# Patient Record
Sex: MALE | Race: WHITE | NOT HISPANIC OR LATINO | Employment: UNEMPLOYED | URBAN - METROPOLITAN AREA
[De-identification: names, ages, dates, MRNs, and addresses within clinical notes are randomized per-mention and may not be internally consistent; named-entity substitution may affect disease eponyms.]

---

## 2023-06-15 ENCOUNTER — OFFICE VISIT (OUTPATIENT)
Dept: GASTROENTEROLOGY | Facility: CLINIC | Age: 58
End: 2023-06-15
Payer: COMMERCIAL

## 2023-06-15 VITALS
HEART RATE: 81 BPM | HEIGHT: 69 IN | SYSTOLIC BLOOD PRESSURE: 142 MMHG | BODY MASS INDEX: 44.14 KG/M2 | OXYGEN SATURATION: 94 % | WEIGHT: 298 LBS | DIASTOLIC BLOOD PRESSURE: 90 MMHG | RESPIRATION RATE: 18 BRPM

## 2023-06-15 DIAGNOSIS — K21.9 GASTROESOPHAGEAL REFLUX DISEASE, UNSPECIFIED WHETHER ESOPHAGITIS PRESENT: Primary | ICD-10-CM

## 2023-06-15 DIAGNOSIS — K44.9 HIATAL HERNIA: ICD-10-CM

## 2023-06-15 DIAGNOSIS — Z12.11 SCREENING FOR COLON CANCER: ICD-10-CM

## 2023-06-15 PROCEDURE — 99204 OFFICE O/P NEW MOD 45 MIN: CPT | Performed by: PHYSICIAN ASSISTANT

## 2023-06-15 RX ORDER — ESOMEPRAZOLE MAGNESIUM 40 MG/1
40 CAPSULE, DELAYED RELEASE ORAL
COMMUNITY
Start: 2023-02-27

## 2023-06-15 RX ORDER — FLUTICASONE PROPIONATE 50 MCG
SPRAY, SUSPENSION (ML) NASAL
COMMUNITY
Start: 2023-06-08

## 2023-06-15 RX ORDER — LISINOPRIL 10 MG/1
10 TABLET ORAL DAILY
COMMUNITY

## 2023-06-15 RX ORDER — HYDROCHLOROTHIAZIDE 12.5 MG/1
12.5 TABLET ORAL DAILY
COMMUNITY

## 2023-06-15 RX ORDER — FEXOFENADINE HCL 180 MG/1
180 TABLET ORAL
COMMUNITY
Start: 2023-06-08

## 2023-06-15 RX ORDER — ATORVASTATIN CALCIUM 40 MG/1
40 TABLET, FILM COATED ORAL DAILY
COMMUNITY

## 2023-06-15 NOTE — PATIENT INSTRUCTIONS
Scheduled date of EGD/colonoscopy (as of today): 6/29/23  Physician performing EGD/colonoscopy: Malryn Romero  Location of EGD/colonoscopy: [facility]  Desired bowel prep reviewed with patient: Modified 2 Day Dulc/Luis A  Instructions reviewed with patient by: Pelon BEVERLY  Clearances:

## 2023-06-15 NOTE — PROGRESS NOTES
[facility] - Outpatient Consultation  Curtis FLORES 66 y o  male MRN: [id number]  Encounter: [id number]          ASSESSMENT AND PLAN:      1  Gastroesophageal reflux disease  2  Hiatal hernia  3  Screening for colon cancer    Patient was referred by the [address] [place] [address] for EGD/colonoscopy  He has a long history of GERD since the 90s and a hiatal hernia  He is PPI dependent on Nexium 40mg po daily  He also had a colonoscopy several years ago but the bowel prep was inadequate and needs a repeat exam   No family history of esophageal, stomach, or colon cancer  Will plan for EGD and colonoscopy to investigate  Will use a modified 2 day bowel prep  Continue Nexium  GERD modifications  Recommend fiber supplementation for his BM irregularities  ______________________________________________________________________    HPI:  Patient is a pleasant 66year old male with a PMH of HTN, hyperlipidemia who was referred by the [address] [place] [address] for an EGD and colonoscopy  Patient has a long history of GERD since the 90s  He has a known hiatal hernia  He is PPI dependent on Nexium 40mg po daily (he reports other PPIs were less effective or caused SEs)  He reports he had a colonoscopy several years ago but the bowel prep was inadequate and he needs a repeat exam   No history of polyps  He reports issues with chronic constipation but also diarrhea at times  No family history of esophageal, stomach, or colon cancer  REVIEW OF SYSTEMS:    CONSTITUTIONAL: Denies any fever, chills, rigors, and weight loss  HEENT: No earache or tinnitus  Denies hearing loss or visual disturbances  CARDIOVASCULAR: No chest pain or palpitations  RESPIRATORY: Denies any cough, hemoptysis, shortness of breath or dyspnea on exertion  GASTROINTESTINAL: As noted in the History of Present Illness  GENITOURINARY: No problems with urination  Denies any hematuria or dysuria  NEUROLOGIC: No dizziness or vertigo, denies headaches  "  MUSCULOSKELETAL: Denies any muscle or joint pain  SKIN: Denies skin rashes or itching  ENDOCRINE: Denies excessive thirst  Denies intolerance to heat or cold  PSYCHOSOCIAL: Denies depression or anxiety  Denies any recent memory loss  Historical Information   Past Medical History:   Diagnosis Date   • Hyperlipidemia    • Hypertension      Past Surgical History:   Procedure Laterality Date   • TONSILLECTOMY AND ADENOIDECTOMY       Social History   Social History     Substance and Sexual Activity   Alcohol Use Not on file     Social History     Substance and Sexual Activity   Drug Use Not on file     Social History     Tobacco Use   Smoking Status Not on file   Smokeless Tobacco Not on file     No family history on file  Meds/Allergies       Current Outpatient Medications:   •  atorvastatin (LIPITOR) 40 mg tablet  •  esomeprazole (NexIUM) 40 MG capsule  •  fexofenadine (ALLEGRA) 180 MG tablet  •  fluticasone (FLONASE) 50 mcg/act nasal spray  •  hydrochlorothiazide (HYDRODIURIL) 12 5 mg tablet  •  lisinopril (ZESTRIL) 10 mg tablet    Allergies   Allergen Reactions   • Pollen Extract Other (See Comments)           Objective     Blood pressure 142/90, pulse 81, resp  rate 18, height 5' 9\" (1 753 m), weight 135 kg (298 lb), SpO2 94 %  Body mass index is 44 01 kg/m²  PHYSICAL EXAM:      General Appearance:   Alert, cooperative, no distress   HEENT:   Normocephalic, atraumatic, anicteric     Neck:  Supple, symmetrical, trachea midline   Lungs:   Clear to auscultation bilaterally; no rales, rhonchi or wheezing; respirations unlabored    Heart[de-identified]   Regular rate and rhythm; no murmur, rub, or gallop     Abdomen:   Soft, non-tender, non-distended; normal bowel sounds; no masses, no organomegaly    Genitalia:   Deferred    Rectal:   Deferred    Extremities:  No cyanosis, clubbing or edema    Pulses:  2+ and symmetric    Skin:  No jaundice, rashes, or lesions    Lymph nodes:  No palpable cervical " lymphadenopathy        Lab Results:   No visits with results within 1 Day(s) from this visit  Latest known visit with results is:   No results found for any previous visit  Radiology Results:   No results found

## 2023-06-15 NOTE — LETTER
June 19, 2023     Giselle [place], [address]  [facility] [id number]    Patient: No Conti   YOB: 1965   Date of Visit: 6/15/2023       Dear Dr Lourdes Carrington: Thank you for referring Connor Ordoñez to me for evaluation  Below are my notes for this consultation  If you have questions, please do not hesitate to call me  I look forward to following your patient along with you  Sincerely,        Nithin Prince PA-C        CC: No Recipients    Wilfrido Patel  6/15/2023 10:22 AM  Attested  [facility] - Outpatient Consultation  Himanshu Erazo 66 y o  male MRN: [id number]  Encounter: [id number]          ASSESSMENT AND PLAN:      1  Gastroesophageal reflux disease  2  Hiatal hernia  3  Screening for colon cancer    Patient was referred by the [facility] for EGD/colonoscopy  He has a long history of GERD since the 90s and a hiatal hernia  He is PPI dependent on Nexium 40mg po daily  He also had a colonoscopy several years ago but the bowel prep was inadequate and needs a repeat exam   No family history of esophageal, stomach, or colon cancer  Will plan for EGD and colonoscopy to investigate  Will use a modified 2 day bowel prep  Continue Nexium  GERD modifications  Recommend fiber supplementation for his BM irregularities  ______________________________________________________________________    HPI:  Patient is a pleasant 66year old male with a PMH of HTN, hyperlipidemia who was referred by the [facility] for an EGD and colonoscopy  Patient has a long history of GERD since the 90s  He has a known hiatal hernia  He is PPI dependent on Nexium 40mg po daily (he reports other PPIs were less effective or caused SEs)  He reports he had a colonoscopy several years ago but the bowel prep was inadequate and he needs a repeat exam   No history of polyps  He reports issues with chronic constipation but also diarrhea at times    No family history of "esophageal, stomach, or colon cancer  REVIEW OF SYSTEMS:    CONSTITUTIONAL: Denies any fever, chills, rigors, and weight loss  HEENT: No earache or tinnitus  Denies hearing loss or visual disturbances  CARDIOVASCULAR: No chest pain or palpitations  RESPIRATORY: Denies any cough, hemoptysis, shortness of breath or dyspnea on exertion  GASTROINTESTINAL: As noted in the History of Present Illness  GENITOURINARY: No problems with urination  Denies any hematuria or dysuria  NEUROLOGIC: No dizziness or vertigo, denies headaches  MUSCULOSKELETAL: Denies any muscle or joint pain  SKIN: Denies skin rashes or itching  ENDOCRINE: Denies excessive thirst  Denies intolerance to heat or cold  PSYCHOSOCIAL: Denies depression or anxiety  Denies any recent memory loss  Historical Information   Past Medical History:   Diagnosis Date   • Hyperlipidemia    • Hypertension      Past Surgical History:   Procedure Laterality Date   • TONSILLECTOMY AND ADENOIDECTOMY       Social History   Social History     Substance and Sexual Activity   Alcohol Use Not on file     Social History     Substance and Sexual Activity   Drug Use Not on file     Social History     Tobacco Use   Smoking Status Not on file   Smokeless Tobacco Not on file     No family history on file  Meds/Allergies       Current Outpatient Medications:   •  atorvastatin (LIPITOR) 40 mg tablet  •  esomeprazole (NexIUM) 40 MG capsule  •  fexofenadine (ALLEGRA) 180 MG tablet  •  fluticasone (FLONASE) 50 mcg/act nasal spray  •  hydrochlorothiazide (HYDRODIURIL) 12 5 mg tablet  •  lisinopril (ZESTRIL) 10 mg tablet    Allergies   Allergen Reactions   • Pollen Extract Other (See Comments)           Objective     Blood pressure 142/90, pulse 81, resp  rate 18, height 5' 9\" (1 753 m), weight 135 kg (298 lb), SpO2 94 %  Body mass index is 44 01 kg/m²          PHYSICAL EXAM:      General Appearance:   Alert, cooperative, no distress   HEENT:   Normocephalic, " atraumatic, anicteric     Neck:  Supple, symmetrical, trachea midline   Lungs:   Clear to auscultation bilaterally; no rales, rhonchi or wheezing; respirations unlabored    Heart[de-identified]   Regular rate and rhythm; no murmur, rub, or gallop  Abdomen:   Soft, non-tender, non-distended; normal bowel sounds; no masses, no organomegaly    Genitalia:   Deferred    Rectal:   Deferred    Extremities:  No cyanosis, clubbing or edema    Pulses:  2+ and symmetric    Skin:  No jaundice, rashes, or lesions    Lymph nodes:  No palpable cervical lymphadenopathy        Lab Results:   No visits with results within 1 Day(s) from this visit  Latest known visit with results is:   No results found for any previous visit  Radiology Results:   No results found  Attestation signed by Maile Camarena MD at 6/15/2023 11:06 AM:  I supervised the Advanced Practitioner  I reviewed the Advanced Practitioner note and agree      Maile Camarena MD 06/15/23